# Patient Record
Sex: MALE | Race: WHITE | URBAN - METROPOLITAN AREA
[De-identification: names, ages, dates, MRNs, and addresses within clinical notes are randomized per-mention and may not be internally consistent; named-entity substitution may affect disease eponyms.]

---

## 2023-07-19 ENCOUNTER — POST-OPERATIVE VISIT (OUTPATIENT)
Facility: LOCATION | Age: 88
End: 2023-07-19
Payer: MEDICARE

## 2023-07-19 DIAGNOSIS — H35.3230 EXUDATIVE AGE-RELATED MACULAR DEGENERATION, `BILATERAL`, STAGE UNSPECIFIED: Primary | ICD-10-CM

## 2023-07-19 DIAGNOSIS — Z48.810 ENCOUNTER FOR SURGICAL AFTERCARE FOLLOWING SURGERY ON A SENSE ORGAN: ICD-10-CM

## 2023-07-19 PROCEDURE — 99024 POSTOP FOLLOW-UP VISIT: CPT | Performed by: OPHTHALMOLOGY

## 2023-07-19 ASSESSMENT — INTRAOCULAR PRESSURE
OD: 15
OS: 13

## 2023-07-19 NOTE — IMPRESSION/PLAN
Impression: Exudative age-related macular degeneration, `BILATERAL`, stage unspecified. Wet AMD Plan: Wet Macular Degeneration OU - Discussed disease process with patient. Recommended AREDS2 vitamins and Amsler grid monitoring daily. Discussed avoiding cigarette smoke, including second-hand smoke.   Recommended immediate referral to [facility] for evaluation and treatment.
-managed by dr. [email or web address]

## 2023-07-19 NOTE — IMPRESSION/PLAN
Impression: S/P POPEYE ORTIZ OD - . Encounter for surgical aftercare following surgery on a sense organ  Z48.810. Plan: S/p Popeye OD - Patient doing well, no complaints. Patient to return to clinic with any new issues.